# Patient Record
Sex: MALE | Race: BLACK OR AFRICAN AMERICAN | NOT HISPANIC OR LATINO | URBAN - METROPOLITAN AREA
[De-identification: names, ages, dates, MRNs, and addresses within clinical notes are randomized per-mention and may not be internally consistent; named-entity substitution may affect disease eponyms.]

---

## 2022-05-28 ENCOUNTER — EMERGENCY (EMERGENCY)
Facility: HOSPITAL | Age: 47
LOS: 0 days | Discharge: HOME | End: 2022-05-28
Attending: EMERGENCY MEDICINE | Admitting: EMERGENCY MEDICINE
Payer: COMMERCIAL

## 2022-05-28 VITALS
OXYGEN SATURATION: 100 % | DIASTOLIC BLOOD PRESSURE: 81 MMHG | SYSTOLIC BLOOD PRESSURE: 182 MMHG | HEART RATE: 60 BPM | RESPIRATION RATE: 18 BRPM | WEIGHT: 167.99 LBS | TEMPERATURE: 98 F

## 2022-05-28 DIAGNOSIS — S61.312A LACERATION WITHOUT FOREIGN BODY OF RIGHT MIDDLE FINGER WITH DAMAGE TO NAIL, INITIAL ENCOUNTER: ICD-10-CM

## 2022-05-28 DIAGNOSIS — S61.212A LACERATION WITHOUT FOREIGN BODY OF RIGHT MIDDLE FINGER WITHOUT DAMAGE TO NAIL, INITIAL ENCOUNTER: ICD-10-CM
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
## 2022-05-28 PROCEDURE — 99283 EMERGENCY DEPT VISIT LOW MDM: CPT

## 2022-05-28 RX ORDER — TRANEXAMIC ACID 100 MG/ML
5 INJECTION, SOLUTION INTRAVENOUS ONCE
Refills: 0 | Status: DISCONTINUED | OUTPATIENT
Start: 2022-05-28 | End: 2022-05-28

## 2022-05-28 RX ORDER — CEPHALEXIN 500 MG
1 CAPSULE ORAL
Qty: 21 | Refills: 0
Start: 2022-05-28 | End: 2022-06-03

## 2022-05-28 RX ORDER — CEPHALEXIN 500 MG
500 CAPSULE ORAL ONCE
Refills: 0 | Status: COMPLETED | OUTPATIENT
Start: 2022-05-28 | End: 2022-05-28

## 2022-05-28 RX ADMIN — Medication 500 MILLIGRAM(S): at 21:48

## 2022-05-28 NOTE — ED PROVIDER NOTE - CLINICAL SUMMARY MEDICAL DECISION MAKING FREE TEXT BOX
wound irrigated/cleaned in ER, dressing/txa applied.  pt instructed on continued wound care at home, to d/c on po abx, f/u with hand as outpt.  pt told to return to ER for increased pain, swelling, redness, wound drainage, fever, or any other new/concerning symptoms.  pt understands and agrees with plan.

## 2022-05-28 NOTE — ED PROVIDER NOTE - NS ED ROS FT
Constitutional: (-) fever  Cardiovascular: (-) syncope  Integumentary: (-) rash  Musculoskeltal: right 3rd finger avulsion   Neurological: (-) altered mental status

## 2022-05-28 NOTE — ED PROVIDER NOTE - NS ED ATTENDING STATEMENT MOD
This was a shared visit with the UVALDO. I reviewed and verified the documentation and independently performed the documented:

## 2022-05-28 NOTE — ED PROVIDER NOTE - OBJECTIVE STATEMENT
46 yold male to Ed with no signif med hx c/o avulsion lac to right 3 finger 2t2 mandolin slicer prior to arrival; td >10 yrs; denies numbness tingling;

## 2022-05-28 NOTE — ED ADULT TRIAGE NOTE - CHIEF COMPLAINT QUOTE
Pt presents to the ED with c/o of slicing top of his R middle finger off. Pt states it was above the nail and very minimal was cut off.

## 2022-05-28 NOTE — ED PROVIDER NOTE - PATIENT PORTAL LINK FT
You can access the FollowMyHealth Patient Portal offered by [facility] by registering at the following website: [email or web address]. By joining [employer]’s FollowMyHealth portal, you will also be able to view your health information using other applications (apps) compatible with our system.

## 2022-05-28 NOTE — ED PROVIDER NOTE - ATTENDING APP SHARED VISIT CONTRIBUTION OF CARE
45 y/o male with no sig pmhx in ER with c/o R middle fingertip avulsion laceration, cut using mandolin.  denies any other injuries.  PE - nad, RUE: middle finger with avulsion to distal plantar surface fingertip, no bone/nailbed involvement, mild oozing of blood.  sensation intact.  -wound care.

## 2022-05-28 NOTE — ED PROVIDER NOTE - PHYSICAL EXAMINATION
Vital Signs: I have reviewed the initial vital signs.  Constitutional: well-nourished, appears stated age, no acute distress  Musculoskeletal: right 3rd finger: + avulsion of distal tip not involving bone or fingernail; mild bleeding; sensation, motor fxn at distal tip intact

## 2022-05-28 NOTE — ED PROVIDER NOTE - NSFOLLOWUPINSTRUCTIONS_ED_ALL_ED_FT
Skin Avulsion    WHAT YOU NEED TO KNOW:    Skin avulsion is a wound that happens when skin is torn from your body during an accident or other injury. The torn skin may be lost or too damaged to be repaired, and it must be removed. A wound of this type cannot be stitched closed because there is tissue missing. Avulsion wounds are usually bigger and have more scars because of the missing tissue.    DISCHARGE INSTRUCTIONS:    Medicines:     Antibiotic ointment: Your healthcare provider may tell you to gently rub a topical antibiotic ointment on your wound. This will help prevent an infection and help your wound heal faster.     Pain medicine: You may be given medicine to take away or decrease pain. Do not wait until the pain is severe before you take your medicine.    NSAIDs, such as ibuprofen, help decrease swelling, pain, and fever. This medicine is available with or without a doctor's order. NSAIDs can cause stomach bleeding or kidney problems in certain people. If you take blood thinner medicine, always ask if NSAIDs are safe for you. Always read the medicine label and follow directions. Do not give these medicines to children under 6 months of age without direction from your child's healthcare provider.    Take your medicine as directed. Contact your healthcare provider if you think your medicine is not helping or if you have side effects. Tell him of her if you are allergic to any medicine. Keep a list of the medicines, vitamins, and herbs you take. Include the amounts, and when and why you take them. Bring the list or the pill bottles to follow-up visits. Carry your medicine list with you in case of an emergency.    Care for your wound: Avulsion wounds may take longer to heal because they cannot be closed with tape or stitches. Keep your wound clean and protected to prevent infection and speed healing.     Clean your wound: Wash your hands with soap and water before and after you care for your wound. You may be able to use a soft cloth to gently clean the wound after the first 24 to 48 hours. After that, gently clean the wound once or twice a day with cool water. Do not soak your wound. Use soap to clean around the wound, but try not to get any on the wound itself. Do not use alcohol or hydrogen peroxide to clean your wound unless you are directed to. Gently pat the area dry and reapply the bandage as directed.     Elevate your wound: Prop your injured area on pillows to raise it above the level of your heart. This will help reduce pain and swelling. Do this for 30 minutes at a time, as often as you can.     Bandage your wound: Bandages keep your wound clean, dry, and protected from infection. They may also prevent swelling. Use a bandage that does not stick to your wound, and has a spongy layer to absorb fluids. Leave your bandage on as long as directed. Ask your healthcare provider when and how to change your bandage. Do not wrap the bandage too tightly. This could cut off blood flow and cause more injury.     Use cool compresses: Wet a washcloth or towel with cool water and hold it on your wound as directed. Ask how often to apply the compress and for how long each time.    Reduce scarring: Avoid direct sunlight on your wound. Sunlight may burn or change the color of the new skin over your wound. Use sunscreen (SPF 30 or higher) on the new skin for at least 1 year after it heals.    Support for leg and arm wounds: You may need to use crutches if the wound is on your leg. You may need to use a sling if the wound is on your arm. Crutches and slings help protect the injured area, prevent further injury, and heal the area in the right position.     Follow up with your healthcare provider within 2 days or as directed: If you have stitches, ask when to return to have them removed. Write down your questions so you remember to ask them during your visits.     Contact your healthcare provider if:     You have new pain, or it gets worse.   You have trouble moving the injured body area.   Your wound splits open or does not seem to be healing.    Return to the emergency department if:     You have a fever.   You have painful swelling, redness, or warmth around your wound.  Your wound is red and there are red streaks on your skin starting at your wound and moving upward.   Your wound is draining pus.   You have heavy bleeding or bleeding that does not stop after 10 minutes of holding firm, direct pressure over the wound.  You feel like there is an object stuck in your wound.     © Copyright [employer] 2019 All illustrations and images included in CareNotes are the copyrighted property of [employer] or [employer].

## 2022-05-28 NOTE — ED PROVIDER NOTE - NSFOLLOWUPCLINICS_GEN_ALL_ED_FT
[facility] ([place], [place]) [facility]  Medicine  [address], [place]   Phone: [phone]  Fax:

## 2024-07-02 PROBLEM — Z78.9 OTHER SPECIFIED HEALTH STATUS: Chronic | Status: ACTIVE | Noted: 2022-06-02

## 2024-07-31 ENCOUNTER — APPOINTMENT (OUTPATIENT)
Dept: ORTHOPEDIC SURGERY | Facility: CLINIC | Age: 49
End: 2024-07-31
Payer: COMMERCIAL

## 2024-07-31 DIAGNOSIS — M54.50 LOW BACK PAIN, UNSPECIFIED: ICD-10-CM

## 2024-07-31 DIAGNOSIS — M85.89 OTHER SPECIFIED DISORDERS OF BONE DENSITY AND STRUCTURE, MULTIPLE SITES: ICD-10-CM

## 2024-07-31 DIAGNOSIS — M65.9 SYNOVITIS AND TENOSYNOVITIS, UNSPECIFIED: ICD-10-CM

## 2024-07-31 PROBLEM — Z00.00 ENCOUNTER FOR PREVENTIVE HEALTH EXAMINATION: Status: ACTIVE | Noted: 2024-07-31

## 2024-07-31 PROCEDURE — 73522 X-RAY EXAM HIPS BI 3-4 VIEWS: CPT

## 2024-07-31 PROCEDURE — 99204 OFFICE O/P NEW MOD 45 MIN: CPT

## 2024-07-31 RX ORDER — CHOLECALCIFEROL (VITAMIN D3) 50 MCG
50 MCG TABLET ORAL
Qty: 60 | Refills: 1 | Status: ACTIVE | COMMUNITY
Start: 2024-07-31 | End: 1900-01-01

## 2024-07-31 RX ORDER — ACETAMINOPHEN 500 MG/1
500 TABLET ORAL 3 TIMES DAILY
Qty: 120 | Refills: 0 | Status: ACTIVE | COMMUNITY
Start: 2024-07-31 | End: 1900-01-01

## 2024-07-31 RX ORDER — MELOXICAM 7.5 MG/1
7.5 TABLET ORAL
Qty: 30 | Refills: 0 | Status: ACTIVE | COMMUNITY
Start: 2024-07-31 | End: 1900-01-01

## 2024-07-31 RX ORDER — LIDOCAINE 5% 700 MG/1
5 PATCH TOPICAL
Qty: 10 | Refills: 0 | Status: ACTIVE | COMMUNITY
Start: 2024-07-31 | End: 1900-01-01

## 2024-07-31 RX ORDER — DICLOFENAC SODIUM 1% 10 MG/G
1 GEL TOPICAL DAILY
Qty: 1 | Refills: 4 | Status: ACTIVE | COMMUNITY
Start: 2024-07-31 | End: 1900-01-01

## 2024-07-31 NOTE — DISCUSSION/SUMMARY
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  WE ARE TO GET HIM AN MRI OF HIS LUMBOSACRAL SPINE AND LEFT HIP.  WE ARE PRESCRIBING MELOXICAM, TYLENOL, CALCIUM, VITAMIN D, LIDODERM PATCHES AND VOLTAREN GEL .  THEY ARE NOT ON BLOOD THINNERS AND DID NOT HAVE ULCERS OR KIDNEY PROBLEMS AND YOU ARE NOT PREGNANT OR NURSING  HIP EXAM  The range of motion of the hips both supine and prone are virtually full. Straight leg raising is negative. They are not tender over the greater troches bursa to palpation or to resisted abduction. Their leg lengths are equal. They are neurovascularly intact without signs or symptoms of DVT and with no cord   Straight leg raising is negative.  Crossed straight leg raising is negative.  Reverse straight leg raising is negative.  Dino test is negative.  They will did give some mild discomfort in the low back but no radiating pain and no particular pain by the SI joint.  Range of motion is approximately 80% of full for their age and build with some discomfort at terminal range of motion OVERALL PHYSICAL EXAM GENERAL: Well developed well nourished in no acute distress   MENTAL:Alert and oriented x3, normal affect, Pleasant and accompanied by family   MUSCULOSKELETAL: Ambulating independently   HEENT: NCAT, EOM intact, mucosa moist, neck supple with adequate free rom   CARDIOVASCULAR/HEMATOLOGICAL: no JVD, no peripheral edema, good capillary refill,  skin warm and well perfused, no venous stasis ulcers, pulses intact, no pallor or superficial non-traumatic bruising   NEUROLOGICAL: free passive rom without rigidity or cog wheel motion   RESPIRATORY: no gross stridor or wheezing or increased respiratory effort or use of O2, good capil refill and color   INTEGUMENTARY: skin intact without obvious suspicious lesions where examined OSTEOPENIA We discussed with them the maintenance of bone health through weightbearing activities and general health measures such as smoking cessation, diabetic control and proper weight maintenance.  We did advocate that they take vitamin D 1 to 5000 units a day and calcium citrate 500 mg a day.  This can be obtained over-the-counter.  We stressed that they should take calcium citrate not calcium carbonate which is more like carbon/chalk and is not as well absorbed.  We encourage them to speak to their primary care physician as regards the issue of whether or not they should get a bone density test and possibly be on adjunct of treatment such as Prolia, Fosamax etc.

## 2024-07-31 NOTE — DISCUSSION/SUMMARY
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  WE ARE TO GET HIM AN MRI OF HIS LUMBOSACRAL SPINE AND LEFT HIP.  WE ARE PRESCRIBING MELOXICAM, TYLENOL, CALCIUM, VITAMIN D, LIDODERM PATCHES AND VOLTAREN GEL .  THEY ARE NOT ON BLOOD THINNERS AND DID NOT HAVE ULCERS OR KIDNEY PROBLEMS AND YOU ARE NOT PREGNANT OR NURSING  HIP EXAM  The range of motion of the hips both supine and prone are virtually full. Straight leg raising is negative. They are not tender over the greater troches bursa to palpation or to resisted abduction. Their leg lengths are equal. They are neurovascularly intact without signs or symptoms of DVT and with no cord   Straight leg raising is negative.  Crossed straight leg raising is negative.  Reverse straight leg raising is negative.  Dino test is negative.  They will did give some mild discomfort in the low back but no radiating pain and no particular pain by the SI joint.  Range of motion is approximately 80% of full for their age and build with some discomfort at terminal range of motion OVERALL PHYSICAL EXAM GENERAL: Well developed well nourished in no acute distress   MENTAL:Alert and oriented x3, normal affect, Pleasant and accompanied by family   MUSCULOSKELETAL: Ambulating independently   HEENT: NCAT, EOM intact, mucosa moist, neck supple with adequate free rom   CARDIOVASCULAR/HEMATOLOGICAL: no JVD, no peripheral edema, good capillary refill,  skin warm and well perfused, no venous stasis ulcers, pulses intact, no pallor or superficial non-traumatic bruising   NEUROLOGICAL: free passive rom without rigidity or cog wheel motion   RESPIRATORY: no gross stridor or wheezing or increased respiratory effort or use of O2, good capil refill and color   INTEGUMENTARY: skin intact without obvious suspicious lesions where examined OSTEOPENIA We discussed with them the maintenance of bone health through weightbearing activities and general health measures such as smoking cessation, diabetic control and proper weight maintenance.  We did advocate that they take vitamin D 1 to 5000 units a day and calcium citrate 500 mg a day.  This can be obtained over-the-counter.  We stressed that they should take calcium citrate not calcium carbonate which is more like carbon/chalk and is not as well absorbed.  We encourage them to speak to their primary care physician as regards the issue of whether or not they should get a bone density test and possibly be on adjunct of treatment such as Prolia, Fosamax etc.

## 2025-02-07 NOTE — ED PROCEDURE NOTE - PROCEDURE DATE TIME, MLM
Patient has questions regarding medications. Please follow up with patient for more information.    03-Jun-2022 04:01

## 2025-02-19 ENCOUNTER — APPOINTMENT (OUTPATIENT)
Dept: ORTHOPEDIC SURGERY | Facility: CLINIC | Age: 50
End: 2025-02-19